# Patient Record
Sex: FEMALE | Race: WHITE | Employment: PART TIME | URBAN - METROPOLITAN AREA
[De-identification: names, ages, dates, MRNs, and addresses within clinical notes are randomized per-mention and may not be internally consistent; named-entity substitution may affect disease eponyms.]

---

## 2017-02-22 ENCOUNTER — OFFICE VISIT (OUTPATIENT)
Dept: VASCULAR SURGERY | Age: 62
End: 2017-02-22

## 2017-02-22 VITALS
SYSTOLIC BLOOD PRESSURE: 123 MMHG | BODY MASS INDEX: 24.66 KG/M2 | WEIGHT: 134 LBS | HEIGHT: 62 IN | HEART RATE: 73 BPM | DIASTOLIC BLOOD PRESSURE: 74 MMHG

## 2017-02-22 DIAGNOSIS — I78.1 SPIDER TELANGIECTASIA: Primary | ICD-10-CM

## 2017-02-22 PROCEDURE — 99999 PR OFFICE/OUTPT VISIT,PROCEDURE ONLY: CPT | Performed by: SURGERY

## 2017-02-22 PROCEDURE — 36468 NJX SCLRSNT SPIDER VEINS: CPT | Performed by: SURGERY

## 2017-03-31 ENCOUNTER — OFFICE VISIT (OUTPATIENT)
Dept: VASCULAR SURGERY | Age: 62
End: 2017-03-31

## 2017-03-31 VITALS — DIASTOLIC BLOOD PRESSURE: 57 MMHG | HEART RATE: 68 BPM | SYSTOLIC BLOOD PRESSURE: 109 MMHG

## 2017-03-31 DIAGNOSIS — I78.1 SPIDER TELANGIECTASIA: Primary | ICD-10-CM

## 2017-03-31 PROCEDURE — 99024 POSTOP FOLLOW-UP VISIT: CPT | Performed by: SURGERY

## 2018-01-05 ENCOUNTER — OFFICE VISIT (OUTPATIENT)
Dept: VASCULAR SURGERY | Age: 63
End: 2018-01-05

## 2018-01-05 VITALS
SYSTOLIC BLOOD PRESSURE: 115 MMHG | HEIGHT: 62 IN | WEIGHT: 134 LBS | DIASTOLIC BLOOD PRESSURE: 73 MMHG | HEART RATE: 73 BPM | BODY MASS INDEX: 24.66 KG/M2

## 2018-01-05 DIAGNOSIS — I78.1 SPIDER TELANGIECTASIA: Primary | ICD-10-CM

## 2018-01-05 PROCEDURE — 36468 NJX SCLRSNT SPIDER VEINS: CPT | Performed by: SURGERY

## 2018-01-05 RX ORDER — DOXYCYCLINE HYCLATE 50 MG/1
50 CAPSULE ORAL 2 TIMES DAILY
COMMUNITY

## 2018-02-16 ENCOUNTER — OFFICE VISIT (OUTPATIENT)
Dept: VASCULAR SURGERY | Age: 63
End: 2018-02-16

## 2018-02-16 VITALS — DIASTOLIC BLOOD PRESSURE: 70 MMHG | HEART RATE: 81 BPM | SYSTOLIC BLOOD PRESSURE: 101 MMHG

## 2018-02-16 DIAGNOSIS — I78.1 SPIDER TELANGIECTASIA: Primary | ICD-10-CM

## 2018-02-16 PROCEDURE — 99024 POSTOP FOLLOW-UP VISIT: CPT | Performed by: SURGERY

## 2018-04-13 ENCOUNTER — TELEPHONE (OUTPATIENT)
Dept: VASCULAR SURGERY | Age: 63
End: 2018-04-13

## 2019-01-09 ENCOUNTER — OFFICE VISIT (OUTPATIENT)
Dept: ORTHOPEDIC SURGERY | Age: 64
End: 2019-01-09
Payer: COMMERCIAL

## 2019-01-09 VITALS
HEART RATE: 68 BPM | WEIGHT: 143 LBS | BODY MASS INDEX: 26.31 KG/M2 | DIASTOLIC BLOOD PRESSURE: 85 MMHG | SYSTOLIC BLOOD PRESSURE: 134 MMHG | HEIGHT: 62 IN

## 2019-01-09 DIAGNOSIS — M79.641 RIGHT HAND PAIN: Primary | ICD-10-CM

## 2019-01-09 PROCEDURE — G8419 CALC BMI OUT NRM PARAM NOF/U: HCPCS | Performed by: ORTHOPAEDIC SURGERY

## 2019-01-09 PROCEDURE — 99203 OFFICE O/P NEW LOW 30 MIN: CPT | Performed by: ORTHOPAEDIC SURGERY

## 2019-01-09 PROCEDURE — 1036F TOBACCO NON-USER: CPT | Performed by: ORTHOPAEDIC SURGERY

## 2019-01-09 PROCEDURE — 3017F COLORECTAL CA SCREEN DOC REV: CPT | Performed by: ORTHOPAEDIC SURGERY

## 2019-01-09 PROCEDURE — G8427 DOCREV CUR MEDS BY ELIG CLIN: HCPCS | Performed by: ORTHOPAEDIC SURGERY

## 2019-01-09 PROCEDURE — G8484 FLU IMMUNIZE NO ADMIN: HCPCS | Performed by: ORTHOPAEDIC SURGERY

## 2019-01-09 PROCEDURE — 20605 DRAIN/INJ JOINT/BURSA W/O US: CPT | Performed by: ORTHOPAEDIC SURGERY

## 2019-03-07 ENCOUNTER — TELEPHONE (OUTPATIENT)
Dept: CARDIOTHORACIC SURGERY | Age: 64
End: 2019-03-07

## 2019-04-10 ENCOUNTER — OFFICE VISIT (OUTPATIENT)
Dept: ORTHOPEDIC SURGERY | Age: 64
End: 2019-04-10
Payer: COMMERCIAL

## 2019-04-10 VITALS
DIASTOLIC BLOOD PRESSURE: 65 MMHG | HEART RATE: 66 BPM | HEIGHT: 62 IN | BODY MASS INDEX: 26.33 KG/M2 | WEIGHT: 143.08 LBS | SYSTOLIC BLOOD PRESSURE: 108 MMHG

## 2019-04-10 DIAGNOSIS — M79.641 RIGHT HAND PAIN: Primary | ICD-10-CM

## 2019-04-10 PROCEDURE — 99214 OFFICE O/P EST MOD 30 MIN: CPT | Performed by: ORTHOPAEDIC SURGERY

## 2019-04-10 PROCEDURE — 3017F COLORECTAL CA SCREEN DOC REV: CPT | Performed by: ORTHOPAEDIC SURGERY

## 2019-04-10 PROCEDURE — 1036F TOBACCO NON-USER: CPT | Performed by: ORTHOPAEDIC SURGERY

## 2019-04-10 PROCEDURE — G8419 CALC BMI OUT NRM PARAM NOF/U: HCPCS | Performed by: ORTHOPAEDIC SURGERY

## 2019-04-10 PROCEDURE — G8427 DOCREV CUR MEDS BY ELIG CLIN: HCPCS | Performed by: ORTHOPAEDIC SURGERY

## 2019-04-10 NOTE — PROGRESS NOTES
Date:  4/10/2019    Name:  Thomas Alcantara  Address:  [address]  [facility] [address] [address]    :  1955      Age:   66 y.o.    SSN:  [id number]      Medical Record Number:  [id number]    Reason for Visit:    Chief Complaint    No chief complaint on file. Currently: Markedly decrease in overall triggering after several months time. She still having irritation in several joints in her hand primarily at the PIP joint level    Previously:  DOS:      HPI: Thomas Alcantara is a 66 y.o. female here today for triggering of the right 4th and pain in the tendon on the right 3rd digits. Previous release of the left hand with good relief. Crushing medicines increase the pain in the right hand overall. Triggering started approximately 4 months ago. Review of Systems:  Review of Systems   Constitutional: Negative for chills and fever. HENT: Negative for nosebleeds. Eyes: Negative for double vision. Cardiovascular: Negative for chest pain. Gastrointestinal: Negative for abdominal pain. Musculoskeletal: Positive for joint pain and myalgias. Skin: Negative for rash. Neurological: Negative for seizures. Psychiatric/Behavioral: Negative for hallucinations.         Past History:  Past Medical History:   Diagnosis Date    Arthritis     Dysfunctional uterine bleeding 2011    GERD (gastroesophageal reflux disease) 2005    Thyroid disease     thyroid nodule    Thyroid nodule     right    Urinary, incontinence, stress female      Past Surgical History:   Procedure Laterality Date    COLONOSCOPY      DILATION AND CURETTAGE OF UTERUS  11    and HYSTEROSCOPY    FINGER TRIGGER RELEASE Left     FOOT SURGERY Left     tendon repair    OTHER SURGICAL HISTORY Right 2014    RIGHT GREATER SAPHENOUS RADIOFREQUENCY ABLATION,    UPPER GASTROINTESTINAL ENDOSCOPY      WISDOM TOOTH EXTRACTION       Current Outpatient Medications on File Prior to Visit   Medication Sig Dispense Refill    Relationship status: Not on file    Intimate partner violence:     Fear of current or ex partner: Not on file     Emotionally abused: Not on file     Physically abused: Not on file     Forced sexual activity: Not on file   Other Topics Concern    Not on file   Social History Narrative    Not on file     Family History   Problem Relation Age of Onset    High Cholesterol Father     Cancer Paternal Grandmother         Breast    Stroke Paternal Grandfather     Stroke Maternal Grandmother     Heart Disease Maternal Grandfather     Diabetes Mother         recent adult onset diabetes       Current Medications:    Current Outpatient Medications   Medication Sig Dispense Refill    Etodolac (LODINE PO) Take 200 mg by mouth      doxycycline (VIBRAMYCIN) 50 MG capsule Take 50 mg by mouth 2 times daily      Ascorbic Acid (VITAMIN C) 500 MG tablet Take 1,000 mg by mouth daily      Fish Oil-Cholecalciferol (OMEGA-3 + VITAMIN D3 PO) Take by mouth      Boswellia-Glucosamine-Vit D (GLUCOSAMINE COMPLEX PO) Take by mouth      Multiple Vitamins-Minerals (MULTIVITAL PO) Take by mouth      Sennosides-Docusate Sodium (SENNA-PLUS PO) Take 2 tablets by mouth as needed.  acetaminophen (TYLENOL) 500 MG tablet Take 500 mg by mouth every 6 hours as needed. Indications: took yesterday in place of Ibuprofen 1-2 tablets prn      docusate sodium (COLACE) 100 MG capsule Take 100 mg by mouth daily.  Fexofenadine-Pseudoephedrine (ALLEGRA-D 24 HOUR PO) Take 1 tablet by mouth daily. No current facility-administered medications for this visit. Allergies:  No Known Allergies    Physical Exam:  There were no vitals filed for this visit. Physical Exam   Constitutional: Patient is oriented to person, place, and time and well-developed, well-nourished, and in no distress. HENT:   Head: Normocephalic and atraumatic. Eyes: Pupils are equal, round, and reactive to light. Neck: No tracheal deviation present.  No previously with no signs of changes int he bone or soft tissue. No osteoarthritis. Assessment: trigger of the right hand with tenosynovitis of the middle digit. Resolved now with residual pain in the base of the thumb and multiple PIP joints some evidence of arthritic node formation at the DIP joints as well      Plan:            Plan at this point is natural supplements topical anti-inflammatories Gen. use of ice and general pathology of trigger fingers as well as joint arthritis was discussed.           Date:    4/10/2019

## 2019-04-29 ENCOUNTER — TELEPHONE (OUTPATIENT)
Dept: VASCULAR SURGERY | Age: 64
End: 2019-04-29

## 2019-05-09 ENCOUNTER — TELEPHONE (OUTPATIENT)
Dept: VASCULAR SURGERY | Age: 64
End: 2019-05-09

## 2019-05-14 ENCOUNTER — TELEPHONE (OUTPATIENT)
Dept: VASCULAR SURGERY | Age: 64
End: 2019-05-14

## 2021-01-26 ENCOUNTER — HOSPITAL ENCOUNTER (EMERGENCY)
Age: 66
Discharge: HOME | End: 2021-01-26
Attending: EMERGENCY MEDICINE
Payer: OTHER GOVERNMENT

## 2021-01-26 VITALS
SYSTOLIC BLOOD PRESSURE: 131 MMHG | HEART RATE: 68 BPM | TEMPERATURE: 98 F | RESPIRATION RATE: 18 BRPM | DIASTOLIC BLOOD PRESSURE: 88 MMHG | OXYGEN SATURATION: 98 %

## 2021-01-26 DIAGNOSIS — F41.1 ANXIETY REACTION: Primary | ICD-10-CM

## 2021-01-26 PROCEDURE — 99283 EMERGENCY DEPT VISIT LOW MDM: CPT

## 2021-01-27 NOTE — ED PROVIDER NOTES
ED Attending Attestation Note     Date of evaluation: 1/26/2021    This patient was seen by the advance practice provider. I have seen and examined the patient, agree with the workup, evaluation, management and diagnosis. The care plan has been discussed. My assessment reveals a well-appearing 28-year-old sitting in bed no apparent cardiorespiratory distress. Intact strength in bilateral upper and lower extremities, intact proprioception bilateral upper and lower extremities.      Thierry Michelle MD  01/26/21 1373

## 2021-01-30 ASSESSMENT — ENCOUNTER SYMPTOMS
SHORTNESS OF BREATH: 0
RHINORRHEA: 0
ABDOMINAL PAIN: 0
SORE THROAT: 0
VOMITING: 0
EYE ITCHING: 0
STRIDOR: 0
EYE REDNESS: 0
SINUS PRESSURE: 0
ABDOMINAL DISTENTION: 0
BACK PAIN: 0
CHEST TIGHTNESS: 0
EYE PAIN: 0
WHEEZING: 0
COLOR CHANGE: 0
COUGH: 0
NAUSEA: 0
DIARRHEA: 0

## 2021-01-30 NOTE — ED PROVIDER NOTES
[facility]          PHYSICIAN ASSISTANT NOTE       Date of evaluation: 1/26/2021    Chief Complaint     Allergic Reaction      History of Present Illness     Araceli Quiroz is a 63 y.o. female with a past medical history as noted below who presents to the Emergency Department with a complaint of a concern for allergic reaction. The patient reports that earlier this afternoon, she received her 1st dose of the COVID-19 vaccine. She reports that within 10 minutes of receiving the vaccine, she started to feel a tingling sensation in her left arm, the arm in which she got the vaccine. Then the patient reports that she began to experience a tingling sensation in the left side of her lower back which began radiating into her leg. She started to experience a tingling sensation in her bilateral lower extremities radiating towards her toes. She says the tingling sensation started to migrate about her body, and she became concerned that this represented an adverse reaction to the pelvic vaccine. The patient reports that she contacted her nurse practitioner, who advised that she should go to the emergency department for evaluation. On arrival to the ED, the patient is very anxious and concerned. She was positive for COVID-19 within the past 60 days, but no longer has any infectious symptoms or fevers. 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 She has been wearing a mask appropriately. No additional complaints or symptoms. Review of Systems     Review of Systems   Constitutional: Negative for chills, diaphoresis, fever and unexpected weight change. HENT: Negative for congestion, drooling, mouth sores, postnasal drip, rhinorrhea, sinus pressure and sore throat. Eyes: Negative for pain, redness and itching. Respiratory: Negative for cough, chest tightness, shortness of breath, wheezing and stridor.     Cardiovascular: Negative for chest pain, palpitations and leg swelling. Gastrointestinal: Negative for abdominal distention, abdominal pain, diarrhea, nausea and vomiting. Genitourinary: Negative for dysuria and hematuria. Musculoskeletal: Negative for arthralgias, back pain, gait problem, myalgias, neck pain and neck stiffness. Skin: Negative for color change, pallor and rash. Neurological: Negative for dizziness, seizures, syncope, weakness, light-headedness, numbness and headaches. A migrating tingling sensation   Hematological: Does not bruise/bleed easily. Psychiatric/Behavioral: Negative for agitation, hallucinations, self-injury, sleep disturbance and suicidal ideas. The patient is nervous/anxious. All other systems reviewed and are negative. Past Medical, Surgical, Family, and Social History     She has a past medical history of Arthritis, Dysfunctional uterine bleeding, GERD (gastroesophageal reflux disease), Thyroid disease, Thyroid nodule, and Urinary, incontinence, stress female. She has a past surgical history that includes Colonoscopy; Altha tooth extraction; Upper gastrointestinal endoscopy; Dilation and curettage of uterus (1/17/11); Foot surgery (Left); Finger trigger release (Left); and other surgical history (Right, 4/25/2014). Her family history includes Cancer in her paternal grandmother; Diabetes in her mother; Heart Disease in her maternal grandfather; High Cholesterol in her father; Stroke in her maternal grandmother and paternal grandfather. She reports that she has never smoked. She has never used smokeless tobacco. She reports current alcohol use. She reports that she does not use drugs.     Medications     Discharge Medication List as of 1/26/2021  9:18 PM      CONTINUE these medications which have NOT CHANGED    Details   Homeopathic Products (SPEEDGEL) GEL Disp-2 Tube, R-11, NormalApply 1-2 grams topically TID      Etodolac (LODINE PO) Take 200 mg by mouthHistorical Med      doxycycline (VIBRAMYCIN) 50 MG capsule Take 50 mg by mouth 2 times dailyHistorical Med      Ascorbic Acid (VITAMIN C) 500 MG tablet Take 1,000 mg by mouth daily      Fish Oil-Cholecalciferol (OMEGA-3 + VITAMIN D3 PO) Take by mouth      Boswellia-Glucosamine-Vit D (GLUCOSAMINE COMPLEX PO) Take by mouth      Multiple Vitamins-Minerals (MULTIVITAL PO) Take by mouth      Sennosides-Docusate Sodium (SENNA-PLUS PO) Take 2 tablets by mouth as needed. acetaminophen (TYLENOL) 500 MG tablet Take 500 mg by mouth every 6 hours as needed. Indications: took yesterday in place of Ibuprofen 1-2 tablets prn      docusate sodium (COLACE) 100 MG capsule Take 100 mg by mouth daily. Fexofenadine-Pseudoephedrine (ALLEGRA-D 24 HOUR PO) Take 1 tablet by mouth daily. Allergies     She has No Known Allergies. Physical Exam     INITIAL VITALS: BP: (!) 178/84, Temp: 98 °F (36.7 °C), Pulse: 81, Resp: 18, SpO2: 96 %  Physical Exam  Vitals signs and nursing note reviewed. Constitutional:       General: She is not in acute distress. Appearance: She is well-developed. She is not diaphoretic. HENT:      Head: Normocephalic and atraumatic. Eyes:      General: No scleral icterus. Conjunctiva/sclera: Conjunctivae normal.      Pupils: Pupils are equal, round, and reactive to light. Neck:      Musculoskeletal: Normal range of motion and neck supple. Vascular: No JVD. Cardiovascular:      Rate and Rhythm: Normal rate and regular rhythm. Heart sounds: Normal heart sounds. Pulmonary:      Effort: Pulmonary effort is normal. No respiratory distress. Breath sounds: Normal breath sounds. No stridor. No wheezing or rales. Chest:      Chest wall: No tenderness. Abdominal:      General: There is no distension. Palpations: Abdomen is soft. Tenderness: There is no abdominal tenderness. Musculoskeletal: Normal range of motion. General: No tenderness. Skin:     General: Skin is warm and dry. Coloration: Skin is not pale. Findings: No rash. Rash is not urticarial.   Neurological:      Mental Status: She is alert and oriented to person, place, and time. Psychiatric:         Attention and Perception: Attention normal.         Mood and Affect: Mood is anxious. Speech: Speech normal.         Behavior: Behavior normal. Behavior is cooperative. Thought Content: Thought content normal.         Cognition and Memory: Cognition normal.         Judgment: Judgment normal.         Diagnostic Results     RADIOLOGY:  No orders to display       LABS:   No results found for this visit on 01/26/21. ED BEDSIDE ULTRASOUND:  N/A    RECENT VITALS:  BP: 131/88, Temp: 98 °F (36.7 °C), Pulse: 68, Resp: 18, SpO2: 98 %     Procedures     N/A    ED Course     Nursing Notes, Past Medical Hx,Past Surgical Hx, Social Hx, Allergies, and Family Hx were reviewed. The patient was given the following medications:  No orders of the defined types were placed in this encounter. CONSULTS:  None    MEDICAL DECISION MAKING / ASSESSMENT / PLAN     Brenton Hutchinson is admitted to the Emergency Department for evaluation of her chief complaint as described in the history of present illness. Complete history and physical was performed by me and my attending. Nursing notes, past medical history, surgical history, family history and social history were reviewed and addressed in the HPI. Ashley Barrow is a 63 y.o. female who presents to the emergency department with a complaint of a concern for allergic reaction to the COVID-19 vaccine. The patient presents the emergency department approximately 2 and half hours after receiving her COVID-19 vaccine, 1st dose. It should be noted, that the patient has been positive for COVID-19 within the past 60 days, expressing approximately a week of symptoms with full recovery. She works in a skilled nursing facility.   She reports that she does not typically get her annual flu immunization, and has not received this year. She notes that she began to experience a tingling sensation in her left arm that has migrated into her back and lower extremities towards her toes. She also notes that her blood pressure is elevated over the past couple of hours, higher than it typically would run. She contacted her primary care provider who advised her to come to the emergency department for evaluation. On presentation, the patient demonstrates anxiousness and anxiety secondary to her complaint. She is hypertensive, but normal cardiac with no tachypnea. Her pulse oximetry is appropriate. There is no evidence of erythema or swelling at the injection site. No urticarial rash. No swelling including angioedema or posterior pharyngeal edema. No wheezing or stridor. No evidence of acute anaphylactic reaction. Furthermore, additional evaluation demonstrates no sensory or motor loss or other gross or focal neurological deficit. The patient admits that she is rather anxious as this is the 1st vaccination she is received in quite some time. I discussed the potential side effects and adverse reactions of the Covid 19 vaccine as well as expected reactions with the patient. As the patient has been exposed to COVID-19 in the past, this essentially is her 2nd exposure to the virus, which may have caused an elevated or ramped up immune response for which she may be experiencing the sequela of. After putting the patient's mind at ease, and 15 minutes of relaxation in the emergency department, her vital signs returned to normal and she no longer had any symptoms. The patient was provided with [facility] handouts on adverse reactions as well as patient education for the Covid 19 vaccine. Strict return precautions for the development of anaphylactoid reaction. She will follow up as needed with primary care. I discussed this plan at length the patient who verbalizes understanding and is in agreement.   The patient is currently stable and will be discharged home for continued self-care. Please see patient's AVS for additional discharge instructions. The patient was seen and evaluated by myself and the attending physician, Malick Pratt M.D., who agrees with my assessment, treatment and plan. Clinical Impression     1. Anxiety reaction        Disposition     PATIENT REFERRED TO:  Nasrin Johnson, MILAGROS - CNP  [address] [facility]  [phone]      Keep your primary care updated on any symptoms after your immunization injection.     [facility] Emergency Department  [address] [place] [address]  [phone]  Go to   If you develop symptoms of difficulty breathing, swelling of the face and mouth, progressive weakness of the lower extremities extending towards the central body, etc.      DISCHARGE MEDICATIONS:  Discharge Medication List as of 1/26/2021  9:18 PM          DISPOSITION Decision To Discharge 01/26/2021 09:12:55 PM     [address] [place] [address]  01/30/21 7968

## 2023-10-30 ENCOUNTER — ANESTHESIA (OUTPATIENT)
Dept: ENDOSCOPY | Age: 68
End: 2023-10-30
Payer: MEDICARE

## 2023-10-30 ENCOUNTER — ANESTHESIA EVENT (OUTPATIENT)
Dept: ENDOSCOPY | Age: 68
End: 2023-10-30
Payer: MEDICARE

## 2023-10-30 ENCOUNTER — HOSPITAL ENCOUNTER (OUTPATIENT)
Age: 68
Setting detail: OUTPATIENT SURGERY
Discharge: HOME | End: 2023-10-30
Attending: INTERNAL MEDICINE | Admitting: INTERNAL MEDICINE
Payer: MEDICARE

## 2023-10-30 VITALS
HEIGHT: 62 IN | TEMPERATURE: 97.4 F | BODY MASS INDEX: 27.23 KG/M2 | DIASTOLIC BLOOD PRESSURE: 74 MMHG | SYSTOLIC BLOOD PRESSURE: 125 MMHG | OXYGEN SATURATION: 100 % | HEART RATE: 75 BPM | WEIGHT: 148 LBS | RESPIRATION RATE: 16 BRPM

## 2023-10-30 DIAGNOSIS — R13.19 ESOPHAGEAL DYSPHAGIA: ICD-10-CM

## 2023-10-30 LAB
GLUCOSE BLD-MCNC: 88 MG/DL (ref 70–99)
PERFORMED ON: NORMAL

## 2023-10-30 PROCEDURE — 3700000000 HC ANESTHESIA ATTENDED CARE: Performed by: INTERNAL MEDICINE

## 2023-10-30 PROCEDURE — 7100000011 HC PHASE II RECOVERY - ADDTL 15 MIN: Performed by: INTERNAL MEDICINE

## 2023-10-30 PROCEDURE — 2709999900 HC NON-CHARGEABLE SUPPLY: Performed by: INTERNAL MEDICINE

## 2023-10-30 PROCEDURE — C1726 CATH, BAL DIL, NON-VASCULAR: HCPCS | Performed by: INTERNAL MEDICINE

## 2023-10-30 PROCEDURE — 2580000003 HC RX 258: Performed by: ANESTHESIOLOGY

## 2023-10-30 PROCEDURE — 88305 TISSUE EXAM BY PATHOLOGIST: CPT

## 2023-10-30 PROCEDURE — 3700000001 HC ADD 15 MINUTES (ANESTHESIA): Performed by: INTERNAL MEDICINE

## 2023-10-30 PROCEDURE — 3609012400 HC EGD TRANSORAL BIOPSY SINGLE/MULTIPLE: Performed by: INTERNAL MEDICINE

## 2023-10-30 PROCEDURE — 7100000010 HC PHASE II RECOVERY - FIRST 15 MIN: Performed by: INTERNAL MEDICINE

## 2023-10-30 PROCEDURE — 6360000002 HC RX W HCPCS: Performed by: NURSE ANESTHETIST, CERTIFIED REGISTERED

## 2023-10-30 PROCEDURE — 3609017700 HC EGD DILATION GASTRIC/DUODENAL STRICTURE: Performed by: INTERNAL MEDICINE

## 2023-10-30 RX ORDER — LIDOCAINE HYDROCHLORIDE 20 MG/ML
INJECTION, SOLUTION INTRAVENOUS PRN
Status: DISCONTINUED | OUTPATIENT
Start: 2023-10-30 | End: 2023-10-30 | Stop reason: SDUPTHER

## 2023-10-30 RX ORDER — ONDANSETRON 2 MG/ML
INJECTION INTRAMUSCULAR; INTRAVENOUS PRN
Status: DISCONTINUED | OUTPATIENT
Start: 2023-10-30 | End: 2023-10-30 | Stop reason: SDUPTHER

## 2023-10-30 RX ORDER — FENTANYL CITRATE 50 UG/ML
INJECTION, SOLUTION INTRAMUSCULAR; INTRAVENOUS PRN
Status: DISCONTINUED | OUTPATIENT
Start: 2023-10-30 | End: 2023-10-30 | Stop reason: SDUPTHER

## 2023-10-30 RX ORDER — LIDOCAINE HYDROCHLORIDE 10 MG/ML
1 INJECTION, SOLUTION EPIDURAL; INFILTRATION; INTRACAUDAL; PERINEURAL
Status: DISCONTINUED | OUTPATIENT
Start: 2023-10-30 | End: 2023-10-30 | Stop reason: HOSPADM

## 2023-10-30 RX ORDER — PROPOFOL 10 MG/ML
INJECTION, EMULSION INTRAVENOUS PRN
Status: DISCONTINUED | OUTPATIENT
Start: 2023-10-30 | End: 2023-10-30 | Stop reason: SDUPTHER

## 2023-10-30 RX ORDER — PSEUDOEPHEDRINE HCL 30 MG
30 TABLET ORAL EVERY 4 HOURS PRN
COMMUNITY

## 2023-10-30 RX ORDER — SODIUM CHLORIDE, SODIUM LACTATE, POTASSIUM CHLORIDE, CALCIUM CHLORIDE 600; 310; 30; 20 MG/100ML; MG/100ML; MG/100ML; MG/100ML
INJECTION, SOLUTION INTRAVENOUS CONTINUOUS
Status: DISCONTINUED | OUTPATIENT
Start: 2023-10-30 | End: 2023-10-30 | Stop reason: HOSPADM

## 2023-10-30 RX ORDER — ACETAMINOPHEN 160 MG
2000 TABLET,DISINTEGRATING ORAL DAILY
COMMUNITY

## 2023-10-30 RX ORDER — SODIUM CHLORIDE 0.9 % (FLUSH) 0.9 %
5-40 SYRINGE (ML) INJECTION PRN
Status: DISCONTINUED | OUTPATIENT
Start: 2023-10-30 | End: 2023-10-30 | Stop reason: HOSPADM

## 2023-10-30 RX ORDER — SODIUM CHLORIDE 0.9 % (FLUSH) 0.9 %
5-40 SYRINGE (ML) INJECTION EVERY 12 HOURS SCHEDULED
Status: DISCONTINUED | OUTPATIENT
Start: 2023-10-30 | End: 2023-10-30 | Stop reason: HOSPADM

## 2023-10-30 RX ADMIN — ONDANSETRON 4 MG: 2 INJECTION INTRAMUSCULAR; INTRAVENOUS at 07:57

## 2023-10-30 RX ADMIN — PROPOFOL 100 MG: 10 INJECTION, EMULSION INTRAVENOUS at 07:52

## 2023-10-30 RX ADMIN — SODIUM CHLORIDE, POTASSIUM CHLORIDE, SODIUM LACTATE AND CALCIUM CHLORIDE: 600; 310; 30; 20 INJECTION, SOLUTION INTRAVENOUS at 07:10

## 2023-10-30 RX ADMIN — PROPOFOL 100 MCG/KG/MIN: 10 INJECTION, EMULSION INTRAVENOUS at 07:53

## 2023-10-30 RX ADMIN — LIDOCAINE HYDROCHLORIDE 50 MG: 20 INJECTION, SOLUTION INTRAVENOUS at 07:52

## 2023-10-30 RX ADMIN — FENTANYL CITRATE 50 MCG: 50 INJECTION, SOLUTION INTRAMUSCULAR; INTRAVENOUS at 07:55

## 2023-10-30 RX ADMIN — PROPOFOL 50 MG: 10 INJECTION, EMULSION INTRAVENOUS at 07:56

## 2023-10-30 RX ADMIN — FENTANYL CITRATE 50 MCG: 50 INJECTION, SOLUTION INTRAMUSCULAR; INTRAVENOUS at 07:52

## 2023-10-30 ASSESSMENT — PAIN - FUNCTIONAL ASSESSMENT: PAIN_FUNCTIONAL_ASSESSMENT: 0-10

## 2023-10-30 ASSESSMENT — PAIN SCALES - GENERAL
PAINLEVEL_OUTOF10: 0

## 2023-10-30 NOTE — PROGRESS NOTES
Ambulatory Surgery/Procedure Discharge Note    Vitals:    10/30/23 0820   BP: 125/74   Pulse: 75   Resp: 16   Temp:    SpO2: 100%       No intake/output data recorded. Restroom use offered before discharge. Yes    Pain assessment:  level of pain (1-10, 10 severe)  Pain Level: 0  Pt to Endoscopy recovery post EGD, Dilation. Pt denies pain at this time. Pt denies nausea at this time, pt tolerating PO fluids well. Discharge instructions given to pt's  and he states understanding of these instructions. Pt and pt's  state that pt is \"ready to go. \"       Patient discharged to home/self care.  Patient discharged via wheel chair by transporter to waiting family/S.O.       10/30/2023 8:40 AM

## 2023-10-30 NOTE — OP NOTE
Endoscopy Note    Patient: Nancy Crowe  : 1955  CSN: [id number]    Procedure: Esophagogastroduodenoscopy with biopsy, esophageal balloon dilation    Date:  10/30/2023     Surgeon:  Chacorta Daley MD     Referring Physician:  MILAGROS Gonzalez CNP    Preoperative Diagnosis:  Pre-Op Diagnosis Codes:     * Esophageal dysphagia [R13.19]    Anesthesia:  MAC    EBL: minimal to none. Description of Procedure:  Informed consent was obtained from the patient after explanation of indications, benefits and possible risks and complications of the procedure. The patient was then taken to the endoscopy suite, placed in the left lateral decubitus position and the above IV sedation was administrered. The Olympus video endoscope was passed through the hypopharynx into the esophagus. The scope was advanced all the way to the third portion of the duodenum. The scope was slowly withdrawn and mucosa was carefully evaluated including a retroflex view of the proximal stomach. Findings and interventions are described below. The patient was decompressed and the scope was then withdrawn. Gastric or Duodenal ulcer present: No    The patient tolerated the procedure well and was taken to the post anesthesia care unit in good condition. There were no immediate complications. Complications: none    Impression:  -LA grade A distal erosive esophagitis status post biopsy  -Mid esophageal biopsies also obtained for eosinophilic esophagitis  -Distal esophagus somewhat narrowed status post balloon dilation from 18 to 20 mm in size      Recommendations: -Await pathology results. Start 40 mg of omeprazole daily. Chacorta Daley MD, MD  [facility]  [phone]    Please note that some or all of this record was generated using voice recognition software. If there are any questions about the content of this document, please contact the author as some errors in translation may have occurred.

## 2023-10-30 NOTE — ANESTHESIA POSTPROCEDURE EVALUATION
Department of Anesthesiology  Postprocedure Note    Patient: Geoff Whitley  MRN: [id number]  [address] [facility] 1955  Date of evaluation: 10/30/2023      Procedure Summary     Date: 10/30/23 Room / Location: [address] [place] [address]    Anesthesia Start: 7801 Anesthesia Stop: 0804    Procedures:       EGD BIOPSY mid-esophogeal bx eval EOE  esophogeal bx eval erosive esophogitis      EGD DILATION BALLOON balloon dilation 18, 19,20 Diagnosis:       Esophageal dysphagia      (Esophageal dysphagia [R13.19])    Surgeons: Martina Kathleen MD Responsible Provider: Emma Gonsalves MD    Anesthesia Type: MAC ASA Status: 2          Anesthesia Type: No value filed.     Uzair Phase I: Uzair Score: 10    Uzair Phase II: Uzair Score: 10      Anesthesia Post Evaluation    Patient location during evaluation: PACU  Patient participation: complete - patient participated  Level of consciousness: awake  Pain score: 0  Airway patency: patent  Nausea & Vomiting: no nausea  Complications: no  Cardiovascular status: hemodynamically stable  Respiratory status: acceptable  Hydration status: stable  Pain management: satisfactory to patient

## 2023-10-30 NOTE — H&P
[facility] ENDOSCOPY  Outpatient Procedure H&P    Patient: Bita Ross MRN: [id number]     YOB: 1955  Age: 74 y.o. Sex: female    Unit: [facility] ENDOSCOPY Room/Bed: Endo Pool/NONE Location: [address] [place] [address]     Procedure: Procedure(s):  ESOPHAGOGASTRODUODENOSCOPY    Indication: Pre-Op Diagnosis Codes:     * Esophageal dysphagia [R13.19]    Referring  Physician:          Nurses past medical history notes reviewed and agreed. Medications reviewed. Allergies: Patient has no known allergies.      Allergies noted: Yes     Past Medical History:   Past Medical History:   Diagnosis Date    Arthritis 2005    Dysfunctional uterine bleeding 1/2011    GERD (gastroesophageal reflux disease) 2005    Thyroid disease     thyroid nodule    Thyroid nodule     right    Urinary, incontinence, stress female        Past Surgical History:   Past Surgical History:   Procedure Laterality Date    COLONOSCOPY      DILATION AND CURETTAGE OF UTERUS  1/17/11    and HYSTEROSCOPY    FINGER TRIGGER RELEASE Left     FOOT SURGERY Left     tendon repair    OTHER SURGICAL HISTORY Right 4/25/2014    RIGHT GREATER SAPHENOUS RADIOFREQUENCY ABLATION,    UPPER GASTROINTESTINAL ENDOSCOPY      WISDOM TOOTH EXTRACTION         Social History:   Social History     Socioeconomic History    Marital status:      Spouse name: Not on file    Number of children: Not on file    Years of education: Not on file    Highest education level: Not on file   Occupational History    Not on file   Tobacco Use    Smoking status: Never    Smokeless tobacco: Never   Substance and Sexual Activity    Alcohol use: Yes     Comment: OCCASIONAL    Drug use: No    Sexual activity: Not on file   Other Topics Concern    Not on file   Social History Narrative    Not on file     Social Determinants of Health     Financial Resource Strain: Not on file   Food Insecurity: Not on file   Transportation Needs: Not on file   Physical Activity: Not on file   Stress: Not

## (undated) DEVICE — FORCEPS BX L240CM JAW DIA2.8MM L CAP W/ NDL MIC MESH TOOTH

## (undated) DEVICE — DILATOR ENDOSCP L180CM DIA6FR BLLN L8CM DIA54-60FR

## (undated) DEVICE — Device